# Patient Record
Sex: MALE | Race: WHITE | NOT HISPANIC OR LATINO | Employment: UNEMPLOYED | URBAN - METROPOLITAN AREA
[De-identification: names, ages, dates, MRNs, and addresses within clinical notes are randomized per-mention and may not be internally consistent; named-entity substitution may affect disease eponyms.]

---

## 2018-03-25 ENCOUNTER — OFFICE VISIT (OUTPATIENT)
Dept: URGENT CARE | Facility: CLINIC | Age: 48
End: 2018-03-25

## 2018-03-25 VITALS
RESPIRATION RATE: 20 BRPM | BODY MASS INDEX: 24.78 KG/M2 | SYSTOLIC BLOOD PRESSURE: 102 MMHG | OXYGEN SATURATION: 97 % | WEIGHT: 177 LBS | HEART RATE: 88 BPM | TEMPERATURE: 96.9 F | DIASTOLIC BLOOD PRESSURE: 72 MMHG | HEIGHT: 71 IN

## 2018-03-25 DIAGNOSIS — J06.9 URTI (ACUTE UPPER RESPIRATORY INFECTION): ICD-10-CM

## 2018-03-25 DIAGNOSIS — J98.01 BRONCHOSPASM: ICD-10-CM

## 2018-03-25 DIAGNOSIS — J20.9 ACUTE BRONCHITIS, UNSPECIFIED ORGANISM: Primary | ICD-10-CM

## 2018-03-25 PROCEDURE — 99213 OFFICE O/P EST LOW 20 MIN: CPT | Performed by: FAMILY MEDICINE

## 2018-03-25 RX ORDER — PREDNISONE 20 MG/1
TABLET ORAL
Qty: 14 TABLET | Refills: 0 | Status: SHIPPED | OUTPATIENT
Start: 2018-03-25

## 2018-03-25 RX ORDER — AZITHROMYCIN 250 MG/1
TABLET, FILM COATED ORAL
Qty: 6 TABLET | Refills: 0 | Status: SHIPPED | OUTPATIENT
Start: 2018-03-25 | End: 2018-03-29

## 2018-03-25 NOTE — PROGRESS NOTES
Assessment/Plan:    No problem-specific Assessment & Plan notes found for this encounter  Diagnoses and all orders for this visit:    Acute bronchitis, unspecified organism  -     predniSONE 20 mg tablet; 2 PO QD X 4 DAYS, THEN 1 PO QD X 4 DAYS, THEN 1/2 PO QD X 4 DAYS  -     azithromycin (ZITHROMAX) 250 mg tablet; 2 TABS PO TODAY, THEN 1 TAB PO QD X 4 DAYS    URTI (acute upper respiratory infection)    Bronchospasm  -     predniSONE 20 mg tablet; 2 PO QD X 4 DAYS, THEN 1 PO QD X 4 DAYS, THEN 1/2 PO QD X 4 DAYS  -     azithromycin (ZITHROMAX) 250 mg tablet; 2 TABS PO TODAY, THEN 1 TAB PO QD X 4 DAYS          Subjective:      Patient ID: Sabrina Merida is a 50 y o  male  Cough   This is a new problem  The current episode started in the past 7 days  The problem has been gradually worsening  The cough is non-productive  Associated symptoms include ear congestion, a fever, nasal congestion, postnasal drip and a sore throat  Pertinent negatives include no chest pain, chills, ear pain, headaches, heartburn, hemoptysis, myalgias, rash, rhinorrhea, shortness of breath, sweats or wheezing  Nothing aggravates the symptoms  He has tried nothing for the symptoms  The treatment provided mild relief  His past medical history is significant for pneumonia  There is no history of asthma  The following portions of the patient's history were reviewed and updated as appropriate: allergies, current medications, past family history, past medical history, past social history, past surgical history and problem list     Review of Systems   Constitutional: Positive for fever  Negative for chills  HENT: Positive for postnasal drip and sore throat  Negative for ear pain and rhinorrhea  Respiratory: Positive for cough  Negative for hemoptysis, shortness of breath and wheezing  Cardiovascular: Negative for chest pain  Gastrointestinal: Negative for heartburn  Musculoskeletal: Negative for myalgias     Skin: Negative for rash    Neurological: Negative for headaches  Objective:      /72   Pulse 88   Temp (!) 96 9 °F (36 1 °C)   Resp 20   Ht 5' 11" (1 803 m)   Wt 80 3 kg (177 lb)   SpO2 97%   BMI 24 69 kg/m²          Physical Exam   Constitutional: He is oriented to person, place, and time  He appears well-developed and well-nourished  HENT:   Head: Normocephalic and atraumatic  Right Ear: Ear canal normal  A middle ear effusion is present  Left Ear: Ear canal normal  A middle ear effusion is present  Nose: Mucosal edema and rhinorrhea present  Mouth/Throat: Uvula is midline and mucous membranes are normal  Posterior oropharyngeal erythema present  Eyes: Pupils are equal, round, and reactive to light  Right eye exhibits no discharge  Left eye exhibits no discharge  Right conjunctiva is injected  Left conjunctiva is injected  Neck: Normal range of motion  Neck supple  No thyromegaly present  Cardiovascular: Normal rate, regular rhythm and normal heart sounds  No murmur heard  Pulmonary/Chest: Effort normal  No accessory muscle usage  No respiratory distress  He has wheezes  He has rhonchi in the right middle field, the right lower field, the left middle field and the left lower field  He exhibits no tenderness  Abdominal: Soft  Bowel sounds are normal  He exhibits no distension and no mass  There is no tenderness  There is no rebound and no guarding  Musculoskeletal: Normal range of motion  He exhibits no edema or tenderness  Lymphadenopathy:     He has no cervical adenopathy  Neurological: He is alert and oriented to person, place, and time  Skin: Skin is warm and dry  Psychiatric: He has a normal mood and affect   His behavior is normal  Judgment and thought content normal